# Patient Record
Sex: MALE | Race: WHITE
[De-identification: names, ages, dates, MRNs, and addresses within clinical notes are randomized per-mention and may not be internally consistent; named-entity substitution may affect disease eponyms.]

---

## 2020-08-03 ENCOUNTER — HOSPITAL ENCOUNTER (OUTPATIENT)
Dept: HOSPITAL 92 - TBSIIMAG | Age: 55
Discharge: HOME | End: 2020-08-03
Attending: NEUROLOGICAL SURGERY
Payer: COMMERCIAL

## 2020-08-03 DIAGNOSIS — Z98.1: ICD-10-CM

## 2020-08-03 DIAGNOSIS — M47.12: Primary | ICD-10-CM

## 2020-08-03 DIAGNOSIS — M48.02: ICD-10-CM

## 2020-08-03 PROCEDURE — 72050 X-RAY EXAM NECK SPINE 4/5VWS: CPT

## 2020-08-03 PROCEDURE — 72141 MRI NECK SPINE W/O DYE: CPT

## 2020-08-03 NOTE — RAD
CERVICAL SPINE 5 VIEWS INCLUDING FLEXION AND EXTENSION LATERAL VIEWS:

 

HISTORY: 

Cervical spondylosis with myelopathy, bilateral leg weakness, and stumbling.

 

COMPARISON: 

CT cervical spine 9/30/2017.

 

FINDINGS: 

Anterior cervical fusion changes at C5, C6, and C7.  Generalized spondylosis with disk-osteophytosis 
and facet arthrosis.  No evidence for abnormal translation between flexion and extension.  No prevert
ebral soft tissue swelling.  Portions of C1 and the odontoid are obscured on the AP open mouth view a
nd C7 and T1 are partially obscured on the lateral views.

 

IMPRESSION: 

Cervical spondylosis.  Anterior cervical fusion changes at C5, C6, and C7.

 

POS: OFF

## 2020-08-03 NOTE — MRI
CERVICAL SPINE MRI WITHOUT CONTRAST:

 

DATE: 8/3/2020.

 

HISTORY: 

Weakness and numbness, radiculopathy, prior cervical spine surgery.

 

TECHNIQUE: 

Multiplanar, multisequence MR imaging of the cervical spine is provided without contrast.

 

FINDINGS: 

The sagittal STIR imaging demonstrates edematous degenerative end plate change involving the anterior
 C3 inferior end plate and the posterior C7 inferior end plate.  No prevertebral soft tissue swelling
.  No anterolisthesis or retrolisthesis.  Anterior diskectomy and fusion hardware are present at C5-6
 and C6-7.

 

C2-3:  Facet and uncovertebral osteophyte formation noted, right greater than left.  There is disk sp
ace narrowing and disk desiccation.  No significant central canal stenosis.  Mild right neural forami
nal stenosis.

 

C3-4:  There is disk space narrowing with disk desiccation, mild disk bulge, and mild central canal s
tenosis.  There is anterior osteophyte formation.  Bilateral facet and uncovertebral osteophyte forma
tion present with mild left and moderate right neural foraminal stenosis.

 

C4-5:  There is disk space narrowing with disk desiccation and disk bulge.  There is a small central 
disk protrusion effacing the ventral thecal sac with a mild degree of central canal stenosis.  Bilate
ral facet and uncovertebral osteophyte formation noted, right greater than left.  Mild bilateral neur
al foraminal stenosis.

 

C5-6:  Mild bilateral facet hypertrophy with no significant central canal or neural foraminal stenosi
s.

 

C6-7:  Bilateral facet and uncovertebral osteophyte formation.  No central canal stenosis.  Moderate 
right and mild left neural foraminal stenosis.  

 

C7-T1:  There is disk space narrowing with disk desiccation and disk bulge effacing the ventral theca
l sac.  There is moderate central canal stenosis.  Bilateral facet and uncovertebral osteophyte forma
tion causes severe bilateral neural foraminal stenosis, right greater than left. 

 

There is volume loss involving the cervical cord at the C5-6 and C6-7 levels suggesting myelomalacia.
  

 

IMPRESSION: 

Postoperative and degenerative change as detailed above.  No significant stenosis is at the C6-7 leve
l where there is significant central canal and bilateral neural foraminal stenosis.

 

POS: ARELIS

## 2020-08-04 ENCOUNTER — HOSPITAL ENCOUNTER (EMERGENCY)
Dept: HOSPITAL 92 - ERS | Age: 55
Discharge: HOME | End: 2020-08-04
Payer: COMMERCIAL

## 2020-08-04 DIAGNOSIS — E86.0: Primary | ICD-10-CM

## 2020-08-04 DIAGNOSIS — Z79.899: ICD-10-CM

## 2020-08-04 DIAGNOSIS — F32.9: ICD-10-CM

## 2020-08-04 LAB
ALBUMIN SERPL BCG-MCNC: 4.2 G/DL (ref 3.5–5)
ALP SERPL-CCNC: 112 U/L (ref 40–110)
ALT SERPL W P-5'-P-CCNC: 16 U/L (ref 8–55)
ANION GAP SERPL CALC-SCNC: 14 MMOL/L (ref 10–20)
AST SERPL-CCNC: 21 U/L (ref 5–34)
BASOPHILS # BLD AUTO: 0 THOU/UL (ref 0–0.2)
BASOPHILS NFR BLD AUTO: 0.4 % (ref 0–1)
BILIRUB SERPL-MCNC: 0.5 MG/DL (ref 0.2–1.2)
BUN SERPL-MCNC: 20 MG/DL (ref 8.4–25.7)
CALCIUM SERPL-MCNC: 9 MG/DL (ref 7.8–10.44)
CHLORIDE SERPL-SCNC: 100 MMOL/L (ref 98–107)
CK SERPL-CCNC: 80 U/L (ref 30–200)
CO2 SERPL-SCNC: 27 MMOL/L (ref 22–29)
CREAT CL PREDICTED SERPL C-G-VRATE: 0 ML/MIN (ref 70–130)
EOSINOPHIL # BLD AUTO: 0.8 THOU/UL (ref 0–0.7)
EOSINOPHIL NFR BLD AUTO: 10.8 % (ref 0–10)
GLOBULIN SER CALC-MCNC: 2.9 G/DL (ref 2.4–3.5)
GLUCOSE SERPL-MCNC: 95 MG/DL (ref 70–105)
HGB BLD-MCNC: 13.8 G/DL (ref 14–18)
LYMPHOCYTES # BLD: 1.6 THOU/UL (ref 1.2–3.4)
LYMPHOCYTES NFR BLD AUTO: 20.4 % (ref 21–51)
MCH RBC QN AUTO: 31.4 PG (ref 27–31)
MCV RBC AUTO: 92.8 FL (ref 78–98)
MONOCYTES # BLD AUTO: 0.6 THOU/UL (ref 0.11–0.59)
MONOCYTES NFR BLD AUTO: 8.2 % (ref 0–10)
NEUTROPHILS # BLD AUTO: 4.7 THOU/UL (ref 1.4–6.5)
NEUTROPHILS NFR BLD AUTO: 60.2 % (ref 42–75)
PLATELET # BLD AUTO: 261 THOU/UL (ref 130–400)
POTASSIUM SERPL-SCNC: 4.1 MMOL/L (ref 3.5–5.1)
RBC # BLD AUTO: 4.4 MILL/UL (ref 4.7–6.1)
SODIUM SERPL-SCNC: 137 MMOL/L (ref 136–145)
WBC # BLD AUTO: 7.8 THOU/UL (ref 4.8–10.8)

## 2020-08-04 PROCEDURE — 85025 COMPLETE CBC W/AUTO DIFF WBC: CPT

## 2020-08-04 PROCEDURE — 36415 COLL VENOUS BLD VENIPUNCTURE: CPT

## 2020-08-04 PROCEDURE — 93005 ELECTROCARDIOGRAM TRACING: CPT

## 2020-08-04 PROCEDURE — 82550 ASSAY OF CK (CPK): CPT

## 2020-08-04 PROCEDURE — 70450 CT HEAD/BRAIN W/O DYE: CPT

## 2020-08-04 PROCEDURE — 80053 COMPREHEN METABOLIC PANEL: CPT

## 2020-08-04 PROCEDURE — 84484 ASSAY OF TROPONIN QUANT: CPT

## 2020-08-04 PROCEDURE — 96360 HYDRATION IV INFUSION INIT: CPT

## 2020-08-04 NOTE — CT
CT BRAIN WITHOUT CONTRAST:



HISTORY: Fall, headache, neck pain



COMPARISON: 7/3/2013



FINDINGS:

No evidence of acute infarct, hemorrhage, midline shift or abnormal extra-axial fluid collections is 
seen. The ventricular size is appropriate and the basilar cisterns are patent. The bony calvarium

is intact. The visualized paranasal sinuses and mastoid air cells are well aerated.



IMPRESSION: No CT evidence of acute intracranial process.



Reported By: Amos Salazar 

Electronically Signed:  8/4/2020 11:17 PM

## 2020-09-01 ENCOUNTER — HOSPITAL ENCOUNTER (OUTPATIENT)
Dept: HOSPITAL 92 - BICMRI | Age: 55
Discharge: HOME | End: 2020-09-01
Attending: NEUROLOGICAL SURGERY
Payer: COMMERCIAL

## 2020-09-01 DIAGNOSIS — G95.9: ICD-10-CM

## 2020-09-01 DIAGNOSIS — M48.04: Primary | ICD-10-CM

## 2020-09-01 DIAGNOSIS — M51.36: ICD-10-CM

## 2020-09-01 DIAGNOSIS — M51.37: ICD-10-CM

## 2020-09-01 DIAGNOSIS — M50.23: ICD-10-CM

## 2020-09-01 DIAGNOSIS — M51.24: ICD-10-CM

## 2020-09-01 DIAGNOSIS — G95.20: ICD-10-CM

## 2020-09-01 PROCEDURE — 72148 MRI LUMBAR SPINE W/O DYE: CPT

## 2020-09-01 PROCEDURE — 72146 MRI CHEST SPINE W/O DYE: CPT

## 2020-09-01 NOTE — MRI
MRI LUMBAR SPINE WITHOUT CONTRAST:

 

Date:  09/01/2020

 

INDICATION:

Myelopathy. Back pain. 

 

FINDINGS:

Lumbar vertebra maintain normal height and alignment. Vertebral body signal is normal. There are dege
nerative disc changes, most prominent at L5-S1 with disc narrowing and degenerative end plate changes
 at this level. The other disc spaces are preserved with mild degenerative signal changes. 

 

L1-2:  No significant disc bulge. Mild to moderate facet hypertrophy. No significant central canal or
 foraminal stenosis. 

 

L2-3:  Mild diffuse disc bulge flattens the thecal sac. Moderate facet arthrosis and hypertrophy. The
se changes compress the thecal sac resulting in mild to moderate central canal stenosis. No significa
nt foraminal stenosis apparent 

 

L3-4:  Mild diffuse disc bulge. Moderate facet hypertrophy with posterior epidural fat. These changes
 compress the thecal sac resulting in mild to moderate central canal stenosis. No significant foramin
al stenosis. 

 

L4-5:  Diffuse disc bulge. Moderate facet hypertrophy with posterior epidural fat. Mild central canal
 stenosis. Bilateral foraminal stenosis secondary to broad based disc bulge extending into the forami
nal zones and the associated facet hypertrophy. 

 

L5-S1:  Broad based disc bulge flattens the anterior thecal sac. Moderate facet hypertrophy. No signi
ficant central canal stenosis. A disc osteophyte complex projects to the right and does encroach into
 the right foramina and appears to displace the exiting right L5 nerve root. 

 

 

 

IMPRESSION: 

Degenerative disc changes at each level as detailed above. 

 

 

POS: AGW

## 2020-09-01 NOTE — MRI
MRI THORACIC SPINE WITHOUT CONTRAST:

 

Date:  09/01/2020

 

INDICATION:

Thoracic stenosis. Back pain. 

 

FINDINGS:

Postoperative changes are noted in the lower cervical spine with plate and screws transfixing C5, C6,
 and C7 levels. 

 

The thoracic vertebra maintain normal height and alignment. Thoracic vertebral bodies exhibit normal 
signal. No edema. 

 

There is disc protrusion at C7-T1 which is incompletely evaluated on this study. This does appear to 
compress the anterior cord as seen on the sagittal image with central canal stenosis. 

 

At T1-T2, there is a mild disc bulge flattening the thecal sac. The anterior subarachnoid space is pr
eserved with no evidence of cord impingement. 

 

At T2-T3, slightly more prominent diffuse disc bulge is seen which abuts the anterior cord. This is s
lightly more pronounced to the right and there is evidence of right foraminal encroachment and narrow
ing. 

 

Minimal disc bulge paracentrally on the left at T6-T7 flattens the anterior thecal sac on the left. N
o cord impingement or central canal stenosis. 

 

At T9-T10, there is a small focal protrusion paracentrally on the left flattening the anterior thecal
 sac on the left and extending in the subarticular zone. No significant cord impingement. 

 

No other significant bulge or protrusion seen. 

 

Thoracic cord signal appears normal. 

 

IMPRESSION: 

Mild disc bulge/protrusions at several levels as described above. There is evidence of cord compressi
on at C7-T1. Disc bulge abuts the cord at T2-T3 as described above. 

 

 

POS: AGW

## 2020-10-14 ENCOUNTER — HOSPITAL ENCOUNTER (OUTPATIENT)
Dept: HOSPITAL 92 - RAD | Age: 55
Discharge: HOME | End: 2020-10-14
Attending: NEUROLOGICAL SURGERY
Payer: COMMERCIAL

## 2020-10-14 VITALS — DIASTOLIC BLOOD PRESSURE: 81 MMHG | TEMPERATURE: 97.8 F | SYSTOLIC BLOOD PRESSURE: 125 MMHG

## 2020-10-14 VITALS — BODY MASS INDEX: 25 KG/M2

## 2020-10-14 DIAGNOSIS — F32.9: ICD-10-CM

## 2020-10-14 DIAGNOSIS — I10: ICD-10-CM

## 2020-10-14 DIAGNOSIS — M48.02: ICD-10-CM

## 2020-10-14 DIAGNOSIS — M47.12: Primary | ICD-10-CM

## 2020-10-14 PROCEDURE — 72126 CT NECK SPINE W/DYE: CPT

## 2020-10-14 PROCEDURE — B02B1ZZ COMPUTERIZED TOMOGRAPHY (CT SCAN) OF SPINAL CORD USING LOW OSMOLAR CONTRAST: ICD-10-PCS | Performed by: RADIOLOGY

## 2020-10-14 PROCEDURE — 62302 MYELOGRAPHY LUMBAR INJECTION: CPT

## 2020-10-14 NOTE — RAD
Cervical myelogram:

10/14/2020



HISTORY: Prior cervical spine surgery, bilateral upper extremity radiculopathy, bilateral lower extre
mity weakness, clumsiness



FINDINGS: Informed consent obtained prior to the procedure.  imaging of the lumbar spine demonst
rates disc space narrowing with degenerative endplate change and anterior osteophyte formation at

L5-S1, and to a lesser degree, L4-5.  imaging of the cervical spine demonstrates anterior discec
nikia and fusion hardware at C5-6/C6-7. There is disc space narrowing with degenerative endplate

change and anterior osteophyte formation at C4-5 and C7-T1.



Skin overlying the lumbar spine was prepped and draped in normal sterile fashion. Skin was anesthetiz
ed at the L4 level with 1% buffered lidocaine. With intermittent fluoroscopic guidance, a 22-gauge

spinal needle is advanced into the thecal sac and removal of the stylet yields clear cerebrospinal fl
uid. Approximately 10 cc of [id number]-[id number] was injected, outlining nerve roots of the cauda equina and

filling the thecal sac. Needle was then removed. The patient's head was tilted down to extend contras
t media into the cervical region.



The patient tolerated the procedure well.



Exposure data:

564 mcg/sq m



IMPRESSION: Successful cervical spine myelogram as detailed above.



Reported By: Armani Saldana 

Electronically Signed:  10/14/2020 9:10 AM

## 2020-10-14 NOTE — CT
CT myelogram cervical spine:

10/14/2020



HISTORY: Lower extremity weakness and clumsiness, bilateral upper extremity radiculopathy/finger numb
ness



TECHNIQUE: Axial CT imaging obtained at 2.5 mm intervals through the cervical spine following the adm
inistration of intrathecal contrast media. Coronal and sagittal reformatted imaging obtained.



FINDINGS: There is moderate degenerative change at the atlantoaxial interspace. The craniocervical ju
nction is intact. There is mild retrolisthesis at C3-4 measuring in the 2 mm range. Mild

anterolisthesis at C4-5 noted measuring in the 3 mm range.



Imaged lung apices demonstrate no acute findings.



The C1 ring, the dens, and the occipital condyles appear intact.



Anterior discectomy and fusion hardware is present at the C5-6/C6-7 levels.



C2-3: No significant central canal or neural foraminal stenosis.



C3-4: There is disc space narrowing with mild disc bulge partially effacing the ventral thecal sac an
d leading to a mild degree of central canal stenosis. There is a triangular osseous density along

the anterior inferior corner of the C3 vertebral body which suggest an old fracture with associated o
steophytosis. Mild bilateral uncovertebral osteophyte formation with mild bilateral neural

foraminal stenosis noted.



C4-5: Minimal disc bulge with partial effacement of the ventral thecal sac and no significant central
 canal or neural foraminal stenosis.



C5-6: A small disc osteophyte complex is present with partial effacement of the ventral thecal sac an
d mild central canal stenosis. No significant neural foraminal stenosis.



C6-7: There is bilateral uncovertebral osteophyte formation, right greater than left, with moderate b
ilateral neural foraminal stenosis, right greater than left. Small posterior left paracentral

osteophyte formation noted with a mild/moderate degree of central canal stenosis.



C7-T1: Bilateral uncovertebral osteophyte formation noted with severe bilateral neural foraminal sten
osis. There is disc space narrowing and degenerative endplate change with anterior osteophyte

formation. Disc osteophyte complex present with moderate central canal stenosis.



No acute osseous abnormality. No worrisome lytic or blastic bone lesion.



IMPRESSION: Postoperative and degenerative changes within the cervical spine as above.



Reported By: Armani Saldana 

Electronically Signed:  10/14/2020 11:20 AM